# Patient Record
Sex: MALE | Race: WHITE | NOT HISPANIC OR LATINO | Employment: OTHER | URBAN - METROPOLITAN AREA
[De-identification: names, ages, dates, MRNs, and addresses within clinical notes are randomized per-mention and may not be internally consistent; named-entity substitution may affect disease eponyms.]

---

## 2018-09-18 ENCOUNTER — OFFICE VISIT (OUTPATIENT)
Dept: FAMILY MEDICINE CLINIC | Facility: CLINIC | Age: 83
End: 2018-09-18
Payer: MEDICARE

## 2018-09-18 VITALS
WEIGHT: 148 LBS | BODY MASS INDEX: 23.23 KG/M2 | TEMPERATURE: 97.6 F | HEIGHT: 67 IN | OXYGEN SATURATION: 99 % | RESPIRATION RATE: 14 BRPM | DIASTOLIC BLOOD PRESSURE: 60 MMHG | SYSTOLIC BLOOD PRESSURE: 110 MMHG | HEART RATE: 58 BPM

## 2018-09-18 DIAGNOSIS — K21.9 GASTROESOPHAGEAL REFLUX DISEASE WITHOUT ESOPHAGITIS: ICD-10-CM

## 2018-09-18 DIAGNOSIS — I35.0 NONRHEUMATIC AORTIC VALVE STENOSIS: ICD-10-CM

## 2018-09-18 DIAGNOSIS — I10 ESSENTIAL HYPERTENSION: Primary | ICD-10-CM

## 2018-09-18 DIAGNOSIS — R26.9 ABNORMAL GAIT: ICD-10-CM

## 2018-09-18 DIAGNOSIS — F41.9 ANXIETY: ICD-10-CM

## 2018-09-18 DIAGNOSIS — H61.23 BILATERAL IMPACTED CERUMEN: ICD-10-CM

## 2018-09-18 DIAGNOSIS — L03.031 CELLULITIS OF TOE OF RIGHT FOOT: ICD-10-CM

## 2018-09-18 PROBLEM — J84.10 PULMONARY FIBROSIS (HCC): Status: ACTIVE | Noted: 2018-09-18

## 2018-09-18 PROBLEM — R42 DIZZINESS: Status: ACTIVE | Noted: 2018-09-18

## 2018-09-18 PROCEDURE — 99205 OFFICE O/P NEW HI 60 MIN: CPT | Performed by: FAMILY MEDICINE

## 2018-09-18 PROCEDURE — 69209 REMOVE IMPACTED EAR WAX UNI: CPT | Performed by: FAMILY MEDICINE

## 2018-09-18 RX ORDER — SULFAMETHOXAZOLE AND TRIMETHOPRIM 800; 160 MG/1; MG/1
1 TABLET ORAL EVERY 12 HOURS SCHEDULED
Qty: 20 TABLET | Refills: 0 | Status: SHIPPED | OUTPATIENT
Start: 2018-09-18 | End: 2018-09-28

## 2018-09-18 RX ORDER — FUROSEMIDE 20 MG/1
20 TABLET ORAL EVERY OTHER DAY
COMMUNITY

## 2018-09-18 RX ORDER — CHOLECALCIFEROL (VITAMIN D3) 125 MCG
10 CAPSULE ORAL
COMMUNITY

## 2018-09-18 RX ORDER — TAMSULOSIN HYDROCHLORIDE 0.4 MG/1
0.4 CAPSULE ORAL EVERY MORNING
COMMUNITY

## 2018-09-18 RX ORDER — POTASSIUM CHLORIDE 750 MG/1
10 CAPSULE, EXTENDED RELEASE ORAL EVERY OTHER DAY
COMMUNITY

## 2018-09-18 RX ORDER — DOCUSATE SODIUM 100 MG/1
100 CAPSULE, LIQUID FILLED ORAL DAILY
COMMUNITY

## 2018-09-18 RX ORDER — ASPIRIN 81 MG/1
81 TABLET ORAL DAILY
COMMUNITY

## 2018-09-18 NOTE — ASSESSMENT & PLAN NOTE
Patient currently taking metoprolol  Patient is unaware that he has ever been hypertensive but carries this diagnosis per nursing home record

## 2018-09-18 NOTE — ASSESSMENT & PLAN NOTE
Patient has been in a wheelchair  Nursing home chart reports gait disturbance  He notes this is mostly when he gets lightheaded or dizzy

## 2018-09-18 NOTE — PATIENT INSTRUCTIONS
Continue current medication  Bactrim DS 1 tab twice a day with food for possible cellulitis at the foot  Yours were clean  Monitor hearing for several weeks before deciding if hearing aids are needed

## 2018-09-18 NOTE — ASSESSMENT & PLAN NOTE
Had been taking Nexium but had a side effect to it  Has not taken any ongoing medications  No solid-food dysphagia

## 2018-09-18 NOTE — PROGRESS NOTES
[facility]         NAME: Honey Thorpe is a 80 y o  male  : 1924    MRN: [id number]  DATE: 2018  TIME: 2:03 PM    Assessment and Plan   Essential hypertension [I10]  1  Essential hypertension     2  Nonrheumatic aortic valve stenosis     3  Gastroesophageal reflux disease without esophagitis     4  Abnormal gait     5  Anxiety     6  Cellulitis of toe of right foot  sulfamethoxazole-trimethoprim (BACTRIM DS) 800-160 mg per tablet   7  Bilateral impacted cerumen         Pulmonary fibrosis ([address] )  This is reported on CT scan per medical record  Patient is unaware of this diagnosis  Will need to review records  Essential hypertension  Patient currently taking metoprolol  Patient is unaware that he has ever been hypertensive but carries this diagnosis per nursing home record  Gastroesophageal reflux disease without esophagitis  Had been taking Nexium but had a side effect to it  Has not taken any ongoing medications  No solid-food dysphagia  Abnormal gait  Patient has been in a wheelchair  Nursing home chart reports gait disturbance  He notes this is mostly when he gets lightheaded or dizzy  Nonrheumatic aortic valve stenosis  Will need to review medical records before further assessment  Patient Instructions     Patient Instructions   Continue current medication  Bactrim DS 1 tab twice a day with food for possible cellulitis at the foot  Yours were clean  Monitor hearing for several weeks before deciding if hearing aids are needed  Total time of visit 39 min-greater than 2/3 was spent with discussion with patient and caregiver along with record review  Chief Complaint     Chief Complaint   Patient presents with    PT Initial Evaluation     new - est care    clogged ears    right big toe pain         History of Present Illness       Patient here to establish care  Nursing home orders are reviewed    Medical records not available during exam however his daughter left thumb for me to review  Medications were reconciled with patient and nursing home record  Medical issues appear to be hypertension-aortic stenosis-pulmonary fibrosis-  Patient comes in today with decreased hearing felt related to wax  Daughter questions whether he needs hearing aids  Review of Systems   Review of Systems   Constitutional: Negative for appetite change, chills, diaphoresis and fever  HENT: Negative for ear pain, rhinorrhea, sinus pressure and sore throat  Eyes: Negative for discharge, redness and itching  Respiratory: Negative for cough, shortness of breath and wheezing  Cardiovascular: Negative for chest pain and palpitations  Rapid or slow heart rate   Gastrointestinal: Negative for abdominal pain, diarrhea, nausea and vomiting  Skin: Positive for color change  Negative for wound           Current Medications       Current Outpatient Prescriptions:     aspirin (ECOTRIN LOW STRENGTH) 81 mg EC tablet, Take 81 mg by mouth daily, Disp: , Rfl:     docusate sodium (COLACE) 100 mg capsule, Take 100 mg by mouth daily, Disp: , Rfl:     furosemide (LASIX) 20 mg tablet, Take 20 mg by mouth every other day, Disp: , Rfl:     Melatonin 5 MG TABS, Take 10 mg by mouth daily at bedtime , Disp: , Rfl:     metoprolol tartrate (LOPRESSOR) 25 mg tablet, Take 12 5 mg by mouth 2 (two) times a day , Disp: , Rfl:     potassium chloride (MICRO-K) 10 MEQ CR capsule, Take 10 mEq by mouth every other day, Disp: , Rfl:     tamsulosin (FLOMAX) 0 4 mg, Take 0 4 mg by mouth every morning, Disp: , Rfl:     sulfamethoxazole-trimethoprim (BACTRIM DS) 800-160 mg per tablet, Take 1 tablet by mouth every 12 (twelve) hours for 10 days, Disp: 20 tablet, Rfl: 0    Current Allergies     Allergies as of 09/18/2018 - Reviewed 09/18/2018   Allergen Reaction Noted    Penicillins  09/18/2018            The following portions of the patient's history were reviewed and updated as appropriate: allergies, current medications, past family history, past medical history, past social history, past surgical history and problem list      No past medical history on file  No past surgical history on file  No family history on file  Medications have been verified  Objective   /60 (BP Location: Right arm, Patient Position: Sitting, Cuff Size: Standard)   Pulse 58   Temp 97 6 °F (36 4 °C) (Oral)   Resp 14   Ht 5' 7" (1 702 m)   Wt 67 1 kg (148 lb) Comment: per pt - unable to get on scale  SpO2 99%   BMI 23 18 kg/m²          Physical Exam     Physical Exam   Constitutional: He appears well-developed and well-nourished  No distress  HENT:   Head: Normocephalic and atraumatic  Right Ear: Tympanic membrane and external ear normal  No drainage  Left Ear: Tympanic membrane normal  There is drainage  Mouth/Throat: No oropharyngeal exudate  Eyes: Conjunctivae and EOM are normal  Right eye exhibits no discharge  Left eye exhibits no discharge  Neck: Normal range of motion  Neck supple  No thyromegaly present  Cardiovascular: Normal rate and regular rhythm  Murmur heard  Systolic murmur is present with a grade of 3/6   Pulmonary/Chest: Effort normal  No respiratory distress  He has no wheezes  He has rales  Musculoskeletal:   Right foot-there is erythema in the 2nd and 3rd toes  This is tender to touch  No ulcerations are noted  Dystrophic nails are present  Lymphadenopathy:     He has no cervical adenopathy  Skin: Skin is warm and dry         Ear cerumen removal  Date/Time: 9/18/2018 2:04 PM  Performed by: Catheline Dance by: Prashanth Becerril     Patient location:  Clinic  Other Assisting Provider: No    Consent:     Consent obtained:  Verbal    Consent given by:  Patient    Risks discussed:  Incomplete removal    Alternatives discussed:  No treatment  Universal protocol:     Procedure explained and questions answered to patient or proxy's satisfaction: yes      Patient identity confirmed:  Verbally with patient  Procedure details:     Local anesthetic:  None    Location:  L ear and R ear    Procedure type: irrigation      Approach:  External  Post-procedure details:     Complication:  Bleeding    Hearing quality:  Improved    Patient tolerance of procedure: Tolerated well, no immediate complications  Comments:      Bilateral cerumen removed  Minimal difficulty  Tympanic membranes are intact after procedure    Subjective increased to normal hearing and 

## 2018-09-18 NOTE — ASSESSMENT & PLAN NOTE
This is reported on CT scan per medical record  Patient is unaware of this diagnosis  Will need to review records

## 2018-09-24 ENCOUNTER — TELEPHONE (OUTPATIENT)
Dept: FAMILY MEDICINE CLINIC | Facility: CLINIC | Age: 83
End: 2018-09-24

## 2018-09-24 NOTE — PROGRESS NOTES
Call patient with lab result-call daughter Franklin Galvez, decreased kidney function, not unexpected for his age  Continue same medications

## 2018-09-24 NOTE — TELEPHONE ENCOUNTER
----- Message from Trinidad Lindsey MD sent at 9/24/2018 11:11 AM EDT -----  Call patient with lab result-call daughter Td Ortega, decreased kidney function, not unexpected for his age  Continue same medications

## 2018-09-27 ENCOUNTER — TELEPHONE (OUTPATIENT)
Dept: FAMILY MEDICINE CLINIC | Facility: CLINIC | Age: 83
End: 2018-09-27

## 2018-09-27 NOTE — TELEPHONE ENCOUNTER
The nurse Britt Lynch from [facility] in [facility] called to report that patient had fallen at approx 1100 today in the bathroom  He had NO Trauma and they will continue to monitor him  They just want to advise Dr Noemy Borjas of the fall    Note sent to Dr Noemy Borjas

## 2018-10-04 ENCOUNTER — TELEPHONE (OUTPATIENT)
Dept: FAMILY MEDICINE CLINIC | Facility: CLINIC | Age: 83
End: 2018-10-04

## 2018-10-04 NOTE — TELEPHONE ENCOUNTER
Phone call to SUYAPA_ discussed some orthostasis with some falls  Will decrease Lasix to 10 mg daily and observe  She will continue to watch his blood pressures and lung sounds due to CHF

## 2018-10-16 DIAGNOSIS — F41.9 ANXIETY: Primary | ICD-10-CM

## 2018-10-16 RX ORDER — LORAZEPAM 1 MG/1
TABLET ORAL
Qty: 30 TABLET | Refills: 3 | Status: SHIPPED | OUTPATIENT
Start: 2018-10-16 | End: 2018-10-29 | Stop reason: SDUPTHER

## 2018-10-25 ENCOUNTER — TELEPHONE (OUTPATIENT)
Dept: FAMILY MEDICINE CLINIC | Facility: CLINIC | Age: 83
End: 2018-10-25
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
## 2018-10-25 NOTE — TELEPHONE ENCOUNTER
Ly Sanabria, RN  Pt fell last night, got up out of bed and fell- Valeriano Maos on right side (unwitnessed)- C/O ride-side pain and has bruise on right side- wants RIGHT RIB XRAY-  Per hospice to R/O fracture    Xray ordered and faxed over to [facility] ([phone])    Task to PM for [facility]

## 2018-10-29 DIAGNOSIS — F41.9 ANXIETY: ICD-10-CM

## 2018-10-29 DIAGNOSIS — R52 PAIN: Primary | ICD-10-CM

## 2018-10-29 RX ORDER — LORAZEPAM 0.5 MG/1
0.5 TABLET ORAL EVERY 6 HOURS PRN
Qty: 60 TABLET | Refills: 0 | Status: SHIPPED | OUTPATIENT
Start: 2018-10-29 | End: 2019-01-16 | Stop reason: SDUPTHER

## 2018-10-29 RX ORDER — MORPHINE SULFATE 100 MG/5ML
0.25 SOLUTION ORAL
Qty: 30 ML | Refills: 0 | Status: SHIPPED | OUTPATIENT
Start: 2018-10-29 | End: 2019-01-16 | Stop reason: SDUPTHER

## 2018-11-01 DIAGNOSIS — M54.9 BACK PAIN, UNSPECIFIED BACK LOCATION, UNSPECIFIED BACK PAIN LATERALITY, UNSPECIFIED CHRONICITY: Primary | ICD-10-CM

## 2018-11-01 RX ORDER — TRAMADOL HYDROCHLORIDE 50 MG/1
50 TABLET ORAL EVERY 6 HOURS PRN
Qty: 30 TABLET | Refills: 0 | Status: SHIPPED | OUTPATIENT
Start: 2018-11-01 | End: 2018-12-05 | Stop reason: SDUPTHER

## 2018-11-01 NOTE — TELEPHONE ENCOUNTER
PER VISITING NURSE PH CALL, PT HAD A FALL LAST Thursday, PT HAS BEEN HAVING BACK PAIN SINCE, NEG XRAY   PLEASE SEND TRAMADOL FOR PT

## 2018-11-15 ENCOUNTER — TELEPHONE (OUTPATIENT)
Dept: FAMILY MEDICINE CLINIC | Facility: CLINIC | Age: 83
End: 2018-11-15

## 2018-11-15 DIAGNOSIS — F32.1 CURRENT MODERATE EPISODE OF MAJOR DEPRESSIVE DISORDER WITHOUT PRIOR EPISODE (HCC): Primary | ICD-10-CM

## 2018-11-15 NOTE — TELEPHONE ENCOUNTER
Pt has been depressed and is in agreement to start taking an antidepressant  Zoloft? PM to advise  [facility]    Also call Meryle Kanaris with order [phone]

## 2018-11-15 NOTE — PROGRESS NOTES
Call patient with lab result-urine culture has only low colony count  Does not suggest a need for antibiotics

## 2018-11-16 ENCOUNTER — TELEPHONE (OUTPATIENT)
Dept: FAMILY MEDICINE CLINIC | Facility: CLINIC | Age: 83
End: 2018-11-16

## 2018-11-16 NOTE — TELEPHONE ENCOUNTER
----- Message from Grover Sepulveda MD sent at 11/15/2018  3:31 PM EST -----  Call patient with lab result-urine culture has only low colony count  Does not suggest a need for antibiotics

## 2018-11-19 NOTE — PROGRESS NOTES
Call patient with lab result-call daughter Griselda Frye, white count was not elevated, mild anemia no real treatment needed at this point

## 2018-11-21 ENCOUNTER — TELEPHONE (OUTPATIENT)
Dept: FAMILY MEDICINE CLINIC | Facility: CLINIC | Age: 83
End: 2018-11-21

## 2018-11-21 NOTE — TELEPHONE ENCOUNTER
Spoke to patients daughter and notified as per Dr Shore Marrow       ----- Message from Radha Serrato MD sent at 11/19/2018  3:10 PM EST -----  Call patient with lab result-call daughter Hollie Murphy, white count was not elevated, mild anemia no real treatment needed at this point

## 2018-12-05 DIAGNOSIS — M54.9 BACK PAIN, UNSPECIFIED BACK LOCATION, UNSPECIFIED BACK PAIN LATERALITY, UNSPECIFIED CHRONICITY: ICD-10-CM

## 2018-12-06 DIAGNOSIS — M54.9 BACK PAIN, UNSPECIFIED BACK LOCATION, UNSPECIFIED BACK PAIN LATERALITY, UNSPECIFIED CHRONICITY: ICD-10-CM

## 2018-12-06 RX ORDER — TRAMADOL HYDROCHLORIDE 50 MG/1
50 TABLET ORAL EVERY 6 HOURS PRN
Qty: 30 TABLET | Refills: 1 | Status: SHIPPED | OUTPATIENT
Start: 2018-12-06

## 2018-12-06 NOTE — TELEPHONE ENCOUNTER
Fax received from [employer] [facility] requesting Rx refill be sent to [facility] in [place] for:  Tramadol 50mg     Review of chart shows this was sent by [facility]

## 2018-12-12 DIAGNOSIS — F32.1 CURRENT MODERATE EPISODE OF MAJOR DEPRESSIVE DISORDER WITHOUT PRIOR EPISODE (HCC): ICD-10-CM

## 2018-12-28 ENCOUNTER — TELEPHONE (OUTPATIENT)
Dept: FAMILY MEDICINE CLINIC | Facility: CLINIC | Age: 83
End: 2018-12-28

## 2018-12-28 NOTE — TELEPHONE ENCOUNTER
----- Message from Kota Carter MD sent at 12/27/2018  7:39 AM EST -----  Call patient with lab result-I believe there is also a urine culture done at the same time  Still no if they have had that result checked

## 2018-12-28 NOTE — TELEPHONE ENCOUNTER
----- Message from Kota Carter MD sent at 12/27/2018  5:26 PM EST -----  Call patient with lab result-call facility urine culture shows low count only  Should not need antibiotic

## 2019-01-03 ENCOUNTER — TELEPHONE (OUTPATIENT)
Dept: FAMILY MEDICINE CLINIC | Facility: CLINIC | Age: 84
End: 2019-01-03

## 2019-01-03 DIAGNOSIS — M25.519 PAIN AND SWELLING OF SHOULDER, UNSPECIFIED LATERALITY: Primary | ICD-10-CM

## 2019-01-03 DIAGNOSIS — M25.419 PAIN AND SWELLING OF SHOULDER, UNSPECIFIED LATERALITY: Primary | ICD-10-CM

## 2019-01-03 NOTE — TELEPHONE ENCOUNTER
FYI TO PM  PT C/O PAIN BI-LAT SHOULDERS- HAD A FALL 2 DAYS AGO- HOSPICE NURSE ASKING FOR XRAY OF BI-LAT SHOULDERS (MOBILE-X)  FAX ORDER -[id number]  ATTCESAR MEZA

## 2019-01-08 DIAGNOSIS — Z51.5 HOSPICE CARE: Primary | ICD-10-CM

## 2019-01-08 RX ORDER — MORPHINE SULFATE 100 MG/5ML
10 SOLUTION ORAL EVERY 2 HOUR PRN
Qty: 15 ML | Refills: 0 | Status: SHIPPED | OUTPATIENT
Start: 2019-01-08

## 2019-01-16 DIAGNOSIS — R52 PAIN: ICD-10-CM

## 2019-01-16 DIAGNOSIS — F41.9 ANXIETY: ICD-10-CM

## 2019-01-16 RX ORDER — MORPHINE SULFATE 100 MG/5ML
0.25 SOLUTION ORAL EVERY 4 HOURS
Qty: 45 ML | Refills: 0 | Status: SHIPPED | OUTPATIENT
Start: 2019-01-16

## 2019-01-16 RX ORDER — LORAZEPAM 0.5 MG/1
0.5 TABLET ORAL EVERY 6 HOURS PRN
Qty: 60 TABLET | Refills: 0 | Status: SHIPPED | OUTPATIENT
Start: 2019-01-16

## 2019-01-16 NOTE — TELEPHONE ENCOUNTER
Received request from [employer] [facility]  [facility] is [facility] the followin )  Morphine 20mg  ml give 0 25ml every 4hours around the clock to ensure comfort  2 )  Lorazapam may be given sublingual    I Spoke to Pola Joseph at [place], she said can either send Rx to her to forward directly to [facility]  Both Rx entered into the system and sent to Dr Winnie Newell for [facility]